# Patient Record
Sex: FEMALE | Race: WHITE | URBAN - METROPOLITAN AREA
[De-identification: names, ages, dates, MRNs, and addresses within clinical notes are randomized per-mention and may not be internally consistent; named-entity substitution may affect disease eponyms.]

---

## 2019-05-07 ENCOUNTER — OFFICE VISIT (OUTPATIENT)
Dept: PEDIATRICS | Facility: CLINIC | Age: 24
End: 2019-05-07
Payer: COMMERCIAL

## 2019-05-07 ENCOUNTER — ANCILLARY PROCEDURE (OUTPATIENT)
Dept: GENERAL RADIOLOGY | Facility: CLINIC | Age: 24
End: 2019-05-07
Attending: PHYSICIAN ASSISTANT
Payer: COMMERCIAL

## 2019-05-07 VITALS
HEART RATE: 80 BPM | SYSTOLIC BLOOD PRESSURE: 110 MMHG | BODY MASS INDEX: 23.92 KG/M2 | RESPIRATION RATE: 16 BRPM | DIASTOLIC BLOOD PRESSURE: 70 MMHG | HEIGHT: 63 IN | WEIGHT: 135 LBS | TEMPERATURE: 97.5 F

## 2019-05-07 DIAGNOSIS — S62.646A CLOSED NONDISPLACED FRACTURE OF PROXIMAL PHALANX OF RIGHT LITTLE FINGER, INITIAL ENCOUNTER: Primary | ICD-10-CM

## 2019-05-07 DIAGNOSIS — M79.641 PAIN OF RIGHT HAND: ICD-10-CM

## 2019-05-07 PROCEDURE — 29130 APPL FINGER SPLINT STATIC: CPT | Performed by: PHYSICIAN ASSISTANT

## 2019-05-07 PROCEDURE — 99203 OFFICE O/P NEW LOW 30 MIN: CPT | Mod: 25 | Performed by: PHYSICIAN ASSISTANT

## 2019-05-07 PROCEDURE — 73130 X-RAY EXAM OF HAND: CPT | Mod: RT

## 2019-05-07 ASSESSMENT — ENCOUNTER SYMPTOMS
GASTROINTESTINAL NEGATIVE: 1
RESPIRATORY NEGATIVE: 1
PSYCHIATRIC NEGATIVE: 1
NEUROLOGICAL NEGATIVE: 1
CARDIOVASCULAR NEGATIVE: 1
CONSTITUTIONAL NEGATIVE: 1
EYES NEGATIVE: 1

## 2019-05-07 ASSESSMENT — MIFFLIN-ST. JEOR: SCORE: 1338.55

## 2019-05-07 NOTE — PROGRESS NOTES
"  SUBJECTIVE:   Ashlyn Resendiz is a 23 year old female who presents to clinic today for the following   health issues:      Musculoskeletal problem/pain      Duration: Injured April 27th    Description  Location: pinky and ring finger on RT hand    Intensity:  moderate    Accompanying signs and symptoms: swelling, redness and bruising    History  Previous similar problem: no   Previous evaluation:  none    Precipitating or alleviating factors:  Trauma or overuse: YES- injured playing basketball  Aggravating factors include: none    Therapies tried and outcome: ice and immobilization    She was playing BB, jammed the right small finger  She had immediate pain  But she kep playing  Bruising the day after  She had swelling    Additional history: as documented    Reviewed  and updated as needed this visit by clinical staff  Tobacco  Allergies  Meds  Med Hx  Surg Hx  Fam Hx  Soc Hx        Reviewed and updated as needed this visit by Provider         There is no problem list on file for this patient.    History reviewed. No pertinent surgical history.    Social History     Tobacco Use     Smoking status: Never Smoker     Smokeless tobacco: Never Used   Substance Use Topics     Alcohol use: Yes     Comment: occationally     History reviewed. No pertinent family history.      No current outpatient medications on file.     No Known Allergies    Review of Systems   Constitutional: Negative.    HENT: Negative.    Eyes: Negative.    Respiratory: Negative.    Cardiovascular: Negative.    Gastrointestinal: Negative.    Genitourinary: Negative.    Musculoskeletal:        As in HPI   Neurological: Negative.    Psychiatric/Behavioral: Negative.        OBJECTIVE:     /70 (Cuff Size: Adult Regular)   Pulse 80   Temp 97.5  F (36.4  C) (Tympanic)   Resp 16   Ht 1.588 m (5' 2.5\")   Wt 61.2 kg (135 lb)   BMI 24.30 kg/m    Body mass index is 24.3 kg/m .    Physical Exam   Constitutional: She is oriented to person, " place, and time. She appears well-developed and well-nourished.   HENT:   Head: Normocephalic and atraumatic.   Nose: Nose normal.   Eyes: Conjunctivae and EOM are normal.   Neck: Normal range of motion.   Pulmonary/Chest: Effort normal.   Musculoskeletal:        Right hand: She exhibits bony tenderness (small finger PIP joint, proximal phalanx, 5th metacarpal) and swelling (mild over small finger and 4th/5th metacarpals). She exhibits normal range of motion (pain with flexion of PIP - able to flex PIP and DIP independently), normal capillary refill, no deformity and no laceration. Normal sensation noted.   Neurological: She is alert and oriented to person, place, and time.   Skin: Skin is warm and dry.   Psychiatric: She has a normal mood and affect. Judgment normal.     XR - 3 views of the right hand  My findings - non-displaced fracture of the right small finger proximal phalanx    PROCEDURE NOTE:  Aluminum finger splint was molded to keep the small finger in optimal position - PIP and DIP at 0 degrees, MCP at 90 degrees flexion.  Splint was then taped and wrapped with ACE bandage to keep in place.     No results found for this or any previous visit (from the past 24 hour(s)).    ASSESSMENT/PLAN:       ICD-10-CM    1. Closed nondisplaced fracture of proximal phalanx of right little finger, initial encounter S62.646A ORTHO  REFERRAL     APPLY FINGER SPLINT STATIC     ALUMINUM FINGER STRIPS 9 X 1/2   2. Pain of right hand M79.641 XR Hand Right G/E 3 Views      - Fracture is nondisplaced  - Splint made today, and patient can remove it to bathe - she should wear the splint at all other times until she sees the orthopedist in follow up.   - Ice  - Her pain is minimal - no need to take OTC medications for inflammation    Return in about 1 week (around 5/14/2019) for Specialist Appointment.    There are no Patient Instructions on file for this visit.    Shailesh Villalba PA-C  [facility]

## 2019-07-16 ENCOUNTER — TELEPHONE (OUTPATIENT)
Dept: FAMILY MEDICINE | Facility: CLINIC | Age: 24
End: 2019-07-16

## 2019-07-16 NOTE — TELEPHONE ENCOUNTER
Panel Management Review      Patient has the following on her problem list: None      Composite cancer screening  Chart review shows that this patient is due/due soon for the following Pap Smear  Summary:    Patient is due/failing the following:   PAP    Action needed:   Patient needs office visit for physical and pap.    Type of outreach:    Phone, left message for patient to call back.     Questions for provider review:    None                                                                                                                                    Nenita Shook CMA

## 2019-07-16 NOTE — LETTER
September 6, 2019    Ashlyn Resendiz  [address] [place] [address]    Dear Jackie Goldberg cares about your health and your health plan.  I have reviewed your medical conditions, medication list and lab results, and am making recommendations based on this review to better manage your health.    You are in particular need of attention regarding:  -Cervical Cancer Screening  -Wellness (Physical) Visit     I am recommending that you:     -schedule a WELLNESS (Physical) APPOINTMENT with me.   I will check fasting labs the same day - nothing to eat except water and meds for 8-10 hours prior.    -schedule a PAP SMEAR EXAM which is due.  Please disregard this reminder if you have had this exam elsewhere within the last year.  It would be helpful for us to have a copy of your recent pap smear report in our file so that we can best coordinate your care.    If you are under/uninsured, we recommend you contact the [facility]. They offer pap smears at no charge or on a sliding fee charge. You can schedule with them at [phone]. Please have them send us the results.      Please call us at the [facility] location:  [phone] or use [employer] to address the above recommendations.     Thank you for trusting [facility].  We appreciate the opportunity to serve you and look forward to supporting your healthcare in the future.    If you have (or plan to have) any of these tests done at a facility other than a [facility] or a [facility], please have the results sent to the [facility] location noted above.      Best Regards,    JOE Armas

## 2019-07-16 NOTE — TELEPHONE ENCOUNTER
Beulah Villalba PA-C Whyte, Colleen E, [facility]             Patient due for preventive care with Pap - please reach out.  If she has completed a Pap through another clinic ([facility]), please abstract report.